# Patient Record
Sex: FEMALE | Race: WHITE | NOT HISPANIC OR LATINO
[De-identification: names, ages, dates, MRNs, and addresses within clinical notes are randomized per-mention and may not be internally consistent; named-entity substitution may affect disease eponyms.]

---

## 2022-07-05 PROBLEM — Z00.00 ENCOUNTER FOR PREVENTIVE HEALTH EXAMINATION: Status: ACTIVE | Noted: 2022-07-05

## 2022-07-06 ENCOUNTER — APPOINTMENT (OUTPATIENT)
Dept: PAIN MANAGEMENT | Facility: CLINIC | Age: 57
End: 2022-07-06

## 2022-07-06 VITALS
BODY MASS INDEX: 25.9 KG/M2 | DIASTOLIC BLOOD PRESSURE: 74 MMHG | SYSTOLIC BLOOD PRESSURE: 149 MMHG | WEIGHT: 165 LBS | HEIGHT: 67 IN | HEART RATE: 78 BPM

## 2022-07-06 PROCEDURE — 99213 OFFICE O/P EST LOW 20 MIN: CPT

## 2022-07-06 NOTE — PHYSICAL EXAM
[Normal Coordination] : normal coordination [Normal DTR UE/LE] : normal DTR UE/LE  [Normal Sensation] : normal sensation [Normal Mood and Affect] : normal mood and affect [Orientated] : orientated [Normal Skin] : normal skin [No Rash] : no rash [No Ulcers] : no ulcers [No Lesions] : no lesions [No obvious lymphadenopathy in areas examined] : no obvious lymphadenopathy in areas examined [] : diminished ROM in all planes

## 2022-07-06 NOTE — HISTORY OF PRESENT ILLNESS
[FreeTextEntry1] : ORIGINAL PRESENTATION: She is a 56-year-old female who continues to follow up with our office regarding cervical radicular complaints status post laminectomy as well as lumbar pain complaints with radicular symptoms status post laminectomy as well. The patient has failed numerous trials of injection therapies in the past and does continue with medication management.\par \par TODAY: I had the pleasure of seeing Ms. Gagnon today in follow up. Her previous history and physical findings have been reviewed.\par \par She is under our care for chronic cervical and lumbar pain with radiculopathy which she is receiving continuing active treatment for.  She states nothing has changed over the past few weeks with respect to her condition.  She continues to remain stable on a regimen of Oxycontin 60 mg BID in conjunction with Oxycodone 30 mg QID for breakthrough pain stating it provides her with at least 50% relief without adverse effects. We will therefore continue as is without change and previous UDS testing is consistent.

## 2022-07-06 NOTE — ASSESSMENT
[FreeTextEntry1] : 57 year old female with chronic lumbar and cervical pain. We will continue to prescribe Oxycontin 60mg bid as well as oxycodone IR 30 mg QID. She will f/u in 4 weeks time for re evaluation. If there is any issue she is aware she can contact the office.\par \par I personally reviewed with the PA, this patient's history and physical exam findings, as documented above. I have discussed the relevant areas of concern, having direct implications to the presenting problems and illnesses, and I have personally examined all pertinent and positive and negative findings, which impact on the prior pain management treatment. \par \par \par Check of the  registry reveals compliance in regards to narcotic medication management use\par \par \par Naomi Fernandez, MS, PA-C\par Abida Haynes MD\par

## 2022-07-12 ENCOUNTER — RX RENEWAL (OUTPATIENT)
Age: 57
End: 2022-07-12

## 2022-10-12 ENCOUNTER — APPOINTMENT (OUTPATIENT)
Dept: PAIN MANAGEMENT | Facility: CLINIC | Age: 57
End: 2022-10-12

## 2022-10-12 PROCEDURE — 99214 OFFICE O/P EST MOD 30 MIN: CPT

## 2022-10-12 NOTE — HISTORY OF PRESENT ILLNESS
[FreeTextEntry1] : ORIGINAL PRESENTATION: She is a 56-year-old female who continues to follow up with our office regarding cervical radicular complaints status post laminectomy as well as lumbar pain complaints with radicular symptoms status post laminectomy as well. The patient has failed numerous trials of injection therapies in the past and does continue with medication management.\par \par TODAY: I had the pleasure of seeing Ms. Gagnon today in follow up. Her previous history and physical findings have been reviewed.\par \par She is under our care for chronic cervical and lumbar pain with radiculopathy which she is receiving continuing active treatment for.  She states over the past few months she has been experiencing more pain her lower back radiating down her L leg with associated numbness.  She currently rates it a 6/10 and at worst at 10/10.  She has not had updated imaging in some time and denies any injury.  In the interim she remains stable on a regimen of Oxycontin 60 mg BID in conjunction with Oxycodone 30 mg QID for breakthrough pain stating it provides her with at least 50% relief without adverse effects. We will therefore continue as is without change and previous UDS testing is consistent.

## 2022-10-12 NOTE — ASSESSMENT
[FreeTextEntry1] : 57 year old female with chronic lumbar and cervical pain. We will continue to prescribe Oxycontin 60mg bid as well as oxycodone IR 30 mg QID.  We will have her undergo updated MRI lumbar spine for further assessment.  She will f/u in 4 weeks time for re evaluation. If there is any issue she is aware she can contact the office.\par \par I personally reviewed with the PA, this patient's history and physical exam findings, as documented above. I have discussed the relevant areas of concern, having direct implications to the presenting problems and illnesses, and I have personally examined all pertinent and positive and negative findings, which impact on the prior pain management treatment. \par \par \par Check of the  registry reveals compliance in regards to narcotic medication management use\par \par Naomi Fernandez, MS, PA-C\par Abhilash Corado, \par

## 2022-10-21 ENCOUNTER — NON-APPOINTMENT (OUTPATIENT)
Age: 57
End: 2022-10-21

## 2022-11-10 ENCOUNTER — APPOINTMENT (OUTPATIENT)
Dept: PAIN MANAGEMENT | Facility: CLINIC | Age: 57
End: 2022-11-10

## 2022-11-21 ENCOUNTER — APPOINTMENT (OUTPATIENT)
Dept: PAIN MANAGEMENT | Facility: CLINIC | Age: 57
End: 2022-11-21

## 2022-11-21 VITALS
HEIGHT: 67 IN | WEIGHT: 165 LBS | BODY MASS INDEX: 25.9 KG/M2 | HEART RATE: 76 BPM | DIASTOLIC BLOOD PRESSURE: 76 MMHG | SYSTOLIC BLOOD PRESSURE: 139 MMHG

## 2022-11-21 LAB — COCAINE METAB.OTHER UR-MCNC: NORMAL

## 2022-11-21 PROCEDURE — 99406 BEHAV CHNG SMOKING 3-10 MIN: CPT

## 2022-11-21 PROCEDURE — 99214 OFFICE O/P EST MOD 30 MIN: CPT

## 2022-11-21 NOTE — ASSESSMENT
[FreeTextEntry1] : Ms. Samantha Gagnon is a 57 year old female with chronic lumbar and cervical pain. We will continue to prescribe Oxycontin 60mg bid as well as oxycodone IR 30 mg QID.   Overall there is at least a 30-50% reduction in pain with the prescribed analgesics. The patient denies any adverse side effects due to the medication (sleeping disturbance, constipation, sleepiness, hallucinations and/or urination problems). She will f/u in 4 weeks time for re evaluation. If there is any issue she is aware she can contact the office.\par \par Check of the  registry reveals compliance in regards to narcotic medication management use\par \par UDS completed on 4/22/22 - consistent. To be repeated today. \par \par I, Natasha Abbott, attest that this documentation has been prepared under the direction and in the presence of Provider Abhilash Corado DO\par The documentation recorded by the scribe, in my presence, accurately reflects the service I personally performed, and the decisions made by me with my edits as appropriate.\par \par Best Regards, \par Abhilash Corado, D.O. \par Diplomat, [employer] Anesthesiology \par Diplomat, American Board of Pain Medicine \par Diplomat, American Board of Pain Management

## 2022-11-21 NOTE — HISTORY OF PRESENT ILLNESS
[FreeTextEntry1] : ORIGINAL PRESENTATION: She is a 56-year-old female who continues to follow up with our office regarding cervical radicular complaints status post laminectomy as well as lumbar pain complaints with radicular symptoms status post laminectomy as well. The patient has failed numerous trials of injection therapies in the past and does continue with medication management.\par \par TODAY: In revisit encounter in regard to her chronic cervical and lumbar pain with radiculopathy which she is receiving continuing active treatment for. She states over the past few months she has been experiencing more pain her lower back radiating down her L leg with associated numbness. She has not had updated imaging in some time and denies any injury.  In the interim she remains stable on a regimen of Oxycontin 60 mg BID in conjunction with Oxycodone 30 mg QID for breakthrough pain stating it provides her with at least 50% relief without adverse effects. We will therefore continue as is without change. \par \par UDS completed on 4/6/22 - consistent. \par \par Covid 19 - This in-office encounter has occurred during a Public Health Emergency (PHE). As required by law, due to the risk of respiratory-transmitted infectious diseases, our office provided additional materials, supplies and clinical staff to assist in keeping our patients, physicians and office staff safe during this health emergency.

## 2022-11-22 ENCOUNTER — LABORATORY RESULT (OUTPATIENT)
Age: 57
End: 2022-11-22

## 2023-01-03 ENCOUNTER — APPOINTMENT (OUTPATIENT)
Dept: PAIN MANAGEMENT | Facility: CLINIC | Age: 58
End: 2023-01-03
Payer: MEDICARE

## 2023-01-03 VITALS
WEIGHT: 165 LBS | SYSTOLIC BLOOD PRESSURE: 116 MMHG | HEART RATE: 116 BPM | DIASTOLIC BLOOD PRESSURE: 73 MMHG | BODY MASS INDEX: 25.9 KG/M2 | HEIGHT: 67 IN

## 2023-01-03 PROCEDURE — 99214 OFFICE O/P EST MOD 30 MIN: CPT

## 2023-01-03 NOTE — ASSESSMENT
[FreeTextEntry1] : Ms. Samantha Gagnon is a 57 year old female with chronic lumbar and cervical pain. She has been managing her pain medically with Oxycontin 60mg BID as well as oxycodone IR 30 mg QID. At this time we will decrease her oxycodone IR 30mg to TID. Overall there is at least a 30-50% reduction in pain with the prescribed analgesics. The patient denies any adverse side effects due to the medication (sleeping disturbance, constipation, sleepiness, hallucinations and/or urination problems). She will f/u in 4 weeks time for re evaluation. If there is any issue she is aware she can contact the office.\par \par Check of the  registry reveals compliance in regards to narcotic medication management use\par \par UDS completed on 11/22/22 - consistent and up to date. \par \par I, Natasha Abbott, attest that this documentation has been prepared under the direction and in the presence of Provider Abhilash Corado DO\par The documentation recorded by the scribe, in my presence, accurately reflects the service I personally performed, and the decisions made by me with my edits as appropriate.\par \par Best Regards, \par Abhilash Corado D.O. \par Diplomat, American Board of Anesthesiology \par Diplomat, American Board of Pain Medicine \par Diplomat, American Board of Pain Management

## 2023-01-03 NOTE — HISTORY OF PRESENT ILLNESS
[FreeTextEntry1] : ORIGINAL PRESENTATION: She is a 56-year-old female who continues to follow up with our office regarding cervical radicular complaints status post laminectomy as well as lumbar pain complaints with radicular symptoms status post laminectomy as well. The patient has failed numerous trials of injection therapies in the past and does continue with medication management.\par \par TODAY: In revisit encounter in regard to her chronic cervical and lumbar pain with radiculopathy which she is receiving continuing active treatment for. She states over the past few months she has been experiencing more pain her lower back radiating down her L leg with associated numbness. She has not had updated imaging in some time and denies any injury.  In the interim she remains stable on a regimen of Oxycontin 60 mg BID in conjunction with Oxycodone 30 mg QID for breakthrough pain stating it provides her with at least 50% relief without adverse effects. \par \par UDS completed on 4/6/22 - consistent. \par \par Covid 19 - This in-office encounter has occurred during a Public Health Emergency (PHE). As required by law, due to the risk of respiratory-transmitted infectious diseases, our office provided additional materials, supplies and clinical staff to assist in keeping our patients, physicians and office staff safe during this health emergency.

## 2023-01-31 ENCOUNTER — APPOINTMENT (OUTPATIENT)
Dept: PAIN MANAGEMENT | Facility: CLINIC | Age: 58
End: 2023-01-31
Payer: MEDICARE

## 2023-01-31 VITALS — WEIGHT: 165 LBS | BODY MASS INDEX: 25.9 KG/M2 | HEIGHT: 67 IN

## 2023-01-31 PROCEDURE — 99213 OFFICE O/P EST LOW 20 MIN: CPT

## 2023-01-31 NOTE — HISTORY OF PRESENT ILLNESS
[FreeTextEntry1] : ORIGINAL PRESENTATION: She is a 57-year-old female who continues to follow up with our office regarding cervical radicular complaints status post laminectomy as well as lumbar pain complaints with radicular symptoms status post laminectomy as well. The patient has failed numerous trials of injection therapies in the past and does continue with medication management.\par \par TODAY: She presents for a revisit appointment. She suffers with chronic cervical and lumbar pain with radiculopathy which she is receiving continuing active treatment for. She has been experiencing more pain in her lower back radiating down her L leg with associated numbness. She plans to undergo a MRI of the lumbar spine in the near future.\par \par She is medically managed on a regimen of Oxycontin 60 mg BID in conjunction with Oxycodone 30 mg TID for breakthrough. Her Oxycodone was decreased from QID to TID on her last visit. Patient wishes to hold off on decreasing further.\par \par UDS: 11/2022 - Consistent.\par SOWANG, updated today, 1/31/23 - LOW RISK.

## 2023-01-31 NOTE — DISCUSSION/SUMMARY
[de-identified] : I have consulted the  registry for the purpose of reviewing the patient's controlled substance.\par \par Overall there is at least a 30-50% reduction in pain with the prescribed analgesics. The patient denies any adverse side effects due to the medication (sleeping disturbance, constipation, sleepiness, hallucinations and/or urination problems). \par \par There are no inconsistencies on urine toxicology screening which would necessitate an alteration of therapy.\par The patient will return to the office in 4 weeks time and is aware to contact me with any question or concerns in the interim.\par \par Kaur Gutierrez PA-C\par Abhilash Corado DO\par

## 2023-01-31 NOTE — ASSESSMENT
[FreeTextEntry1] : Ms. Samantha Gagnon is a 57 year old female with chronic lumbar and cervical pain along with radiculopathy. She has been managing her pain medically. She is currently on Oxycontin 60mg BID as well as oxycodone IR 30 mg TID. She will continue as is for now. RTO in 4 weeks.

## 2023-02-22 ENCOUNTER — NON-APPOINTMENT (OUTPATIENT)
Age: 58
End: 2023-02-22

## 2023-02-28 ENCOUNTER — APPOINTMENT (OUTPATIENT)
Dept: PAIN MANAGEMENT | Facility: CLINIC | Age: 58
End: 2023-02-28
Payer: MEDICARE

## 2023-02-28 ENCOUNTER — LABORATORY RESULT (OUTPATIENT)
Age: 58
End: 2023-02-28

## 2023-02-28 VITALS
BODY MASS INDEX: 25.9 KG/M2 | DIASTOLIC BLOOD PRESSURE: 89 MMHG | HEART RATE: 97 BPM | SYSTOLIC BLOOD PRESSURE: 148 MMHG | WEIGHT: 165 LBS | HEIGHT: 67 IN

## 2023-02-28 PROCEDURE — 99213 OFFICE O/P EST LOW 20 MIN: CPT

## 2023-02-28 PROCEDURE — 80305 DRUG TEST PRSMV DIR OPT OBS: CPT | Mod: QW

## 2023-02-28 NOTE — HISTORY OF PRESENT ILLNESS
[FreeTextEntry1] : ORIGINAL PRESENTATION: She is a 57-year-old female who continues to follow up with our office regarding cervical radicular complaints status post laminectomy as well as lumbar pain complaints with radicular symptoms status post laminectomy as well. The patient has failed numerous trials of injection therapies in the past and does continue with medication management.\par \par TODAY: She presents for a revisit appointment. She suffers with chronic cervical and lumbar pain with radiculopathy which she is receiving continuing active treatment for. She has been experiencing more pain in her lower back radiating down her L leg with associated numbness. She is in the process of moving to [place] community. Therefore, she has not followed up with a MRI of the lumbar spine yet. She plans to do so in the next few weeks.\par \par She is medically managed on a regimen of Oxycontin 60 mg BID in conjunction with Oxycodone 30 mg TID for breakthrough. She denies side effects.\par \par UDS: repeated today, 2/28/23 - see separate note.\par SOAPP, 1/31/23 - LOW RISK.

## 2023-02-28 NOTE — DISCUSSION/SUMMARY
[de-identified] : I have consulted the  registry for the purpose of reviewing the patient's controlled substance.\par \par Overall there is at least a 30-50% reduction in pain with the prescribed analgesics. The patient denies any adverse side effects due to the medication (sleeping disturbance, constipation, sleepiness, hallucinations and/or urination problems). \par Urine drug screening collected today with rapid sample result consistent with given regimen. Sample to be sent for confirmatory testing.\par The patient will return to the office in 4 weeks time and is aware to contact me with any question or concerns in the interim.\par \par Kaur Gutierrez PA-C\par Abhilash Corado DO\par

## 2023-02-28 NOTE — ASSESSMENT
[FreeTextEntry1] : Ms. Samantha Gagnon is a 57 year old female with chronic lumbar and cervical pain along with radiculopathy. She has been managing her pain medically. She is currently on Oxycontin 60mg BID as well as oxycodone IR 30 mg TID. I have discussed the importance of undergoing a new MRI of the lumbar spine. If she fails to do so, I will have to continue to wean her off of her opioids. She was weaned from Oxycodone IR 30 mg QID to TID a few weeks ago. She will follow up in 4 weeks for reevaluation.

## 2023-03-01 ENCOUNTER — RESULT CHARGE (OUTPATIENT)
Age: 58
End: 2023-03-01

## 2023-03-01 LAB
AMP / AMPHETAMINE: NEGATIVE
BAR / SECOBARBITAL: NEGATIVE
BUP / BUPRENORPHINE: NEGATIVE
BZO / OXAZEPAM: NEGATIVE
COC / COCAINE: NEGATIVE
CREATININE: 100 MG/DL
MDMA / METHYLENEDIOXYMETHAMPHETAMINE: NEGATIVE
MET / METHAMPHETAMINES: NEGATIVE
MOP / MORPHINE: NEGATIVE
MTD / METHADONE: NEGATIVE
OXY / OXYCODONE: POSITIVE
PCP / PHENCYCLIDINE: NEGATIVE
PH: 7
SPECIFIC GRAVITY: 1.03
TEMPERATURE: 94 C
THC / MARIJUANA: NEGATIVE

## 2023-03-28 ENCOUNTER — APPOINTMENT (OUTPATIENT)
Dept: PAIN MANAGEMENT | Facility: CLINIC | Age: 58
End: 2023-03-28
Payer: MEDICARE

## 2023-03-28 VITALS
BODY MASS INDEX: 25.9 KG/M2 | HEIGHT: 67 IN | HEART RATE: 89 BPM | WEIGHT: 165 LBS | DIASTOLIC BLOOD PRESSURE: 72 MMHG | SYSTOLIC BLOOD PRESSURE: 132 MMHG

## 2023-03-28 PROCEDURE — 99213 OFFICE O/P EST LOW 20 MIN: CPT

## 2023-03-28 NOTE — ASSESSMENT
[FreeTextEntry1] : Ms. Samantha Gagnon is a 57 year old female with chronic lumbar and cervical pain along with radiculopathy. She has been managing her pain medically. She is currently on Oxycontin 60mg BID as well as oxycodone IR 30 mg TID. I have discussed the importance of undergoing a new MRI of the lumbar spine. If she fails to do so, I will have to continue to wean her off of her opioids. She plans to get the MRI done in the next few weeks. She will follow up in 4 weeks for reevaluation. A repeat UDS will be done at that time.

## 2023-03-28 NOTE — DISCUSSION/SUMMARY
[de-identified] : I have consulted the  registry for the purpose of reviewing the patient's controlled substance.\par \par Overall there is at least a 30-50% reduction in pain with the prescribed analgesics. The patient denies any adverse side effects due to the medication (sleeping disturbance, constipation, sleepiness, hallucinations and/or urination problems). \par The patient will return to the office in 4 weeks time and is aware to contact me with any question or concerns in the interim.\par \par aKur Gutierrez PA-C\par Abhilash Corado DO\par

## 2023-03-28 NOTE — HISTORY OF PRESENT ILLNESS
[FreeTextEntry1] : ORIGINAL PRESENTATION: She is a 57-year-old female who continues to follow up with our office regarding cervical radicular complaints status post laminectomy as well as lumbar pain complaints with radicular symptoms status post laminectomy as well. The patient has failed numerous trials of injection therapies in the past and does continue with medication management.\par \par TODAY: She presents for a revisit appointment. She suffers with chronic cervical and lumbar pain with radiculopathy which she is receiving continuing active treatment for. She has been experiencing more pain in her lower back radiating down her L leg with associated numbness. She is in the process of moving to a [place] community. Therefore, she has not followed up with a MRI of the lumbar spine yet. She states she will hopefully get it done prior to her next visit.\par \par She is medically managed on a regimen of Oxycontin 60 mg BID in conjunction with Oxycodone 30 mg TID for breakthrough. She denies side effects. No aberrant behavior.\par \par UDS: 2/28/23 - not done due to high pH. \par SOAPP, 1/31/23 - LOW RISK.

## 2023-04-26 ENCOUNTER — LABORATORY RESULT (OUTPATIENT)
Age: 58
End: 2023-04-26

## 2023-04-27 ENCOUNTER — APPOINTMENT (OUTPATIENT)
Dept: PAIN MANAGEMENT | Facility: CLINIC | Age: 58
End: 2023-04-27
Payer: MEDICARE

## 2023-04-27 VITALS — BODY MASS INDEX: 25.9 KG/M2 | HEIGHT: 67 IN | WEIGHT: 165 LBS

## 2023-04-27 LAB
AMP / AMPHETAMINE: NEGATIVE
BAR / SECOBARBITAL: NEGATIVE
BUP / BUPRENORPHINE: NEGATIVE
BZO / OXAZEPAM: NEGATIVE
COC / COCAINE: NEGATIVE
CREATININE: 100 MG/DL
MDMA / METHYLENEDIOXYMETHAMPHETAMINE: NEGATIVE
MET / METHAMPHETAMINES: NEGATIVE
MOP / MORPHINE: POSITIVE
MTD / METHADONE: NEGATIVE
OXY / OXYCODONE: POSITIVE
PCP / PHENCYCLIDINE: NEGATIVE
PH: 5
SPECIFIC GRAVITY: 1.02
TEMPERATURE: 90 C
THC / MARIJUANA: NEGATIVE

## 2023-04-27 PROCEDURE — 99213 OFFICE O/P EST LOW 20 MIN: CPT

## 2023-04-27 PROCEDURE — 80305 DRUG TEST PRSMV DIR OPT OBS: CPT | Mod: QW

## 2023-04-27 NOTE — ASSESSMENT
[FreeTextEntry1] : Ms. Samantha Gagnon is a 57 year old female with chronic lumbar and cervical pain along with radiculopathy. She has been managing her pain medically. She is currently on Oxycontin 60mg BID as well as oxycodone IR 30 mg TID. I have discussed the importance of undergoing a new MRI of the lumbar spine. If she fails to do so, I will have to continue to wean her off of her opioids. She plans to get the MRI done in the next few weeks. She will follow up in 4 weeks for reevaluation.

## 2023-04-27 NOTE — DISCUSSION/SUMMARY
[de-identified] : I have consulted the  registry for the purpose of reviewing the patient's controlled substance.\par \par Overall there is at least a 30-50% reduction in pain with the prescribed analgesics. The patient denies any adverse side effects due to the medication (sleeping disturbance, constipation, sleepiness, hallucinations and/or urination problems). \par Urine drug screening collected today with rapid sample result consistent with given regimen. Sample to be sent for confirmatory testing.\par The patient will return to the office in 4 weeks time and is aware to contact me with any question or concerns in the interim.\par \par Kaur Gutierrez PA-C\par Abhilash Corado DO\par

## 2023-04-27 NOTE — HISTORY OF PRESENT ILLNESS
[FreeTextEntry1] : ORIGINAL PRESENTATION: She is a 57-year-old female who continues to follow up with our office regarding cervical radicular complaints status post laminectomy as well as lumbar pain complaints with radicular symptoms status post laminectomy as well. The patient has failed numerous trials of injection therapies in the past and does continue with medication management.\par \par TODAY: She presents for a revisit appointment. She suffers with chronic cervical and lumbar pain with radiculopathy which she is receiving continuing active treatment for. She has been experiencing more pain in her lower back radiating down her L leg with associated numbness. She will be moving to a [place] community tomorrow. Therefore, she has not followed up with a MRI of the lumbar spine yet. \par \par She is medically managed on a regimen of Oxycontin 60 mg BID in conjunction with Oxycodone 30 mg TID for breakthrough. She denies side effects. No aberrant behavior.\par \par UDS: repeated today, 4/27/23 - see separate note.\par SOAPP, 1/31/23 - LOW RISK.

## 2023-05-03 LAB

## 2023-05-25 ENCOUNTER — APPOINTMENT (OUTPATIENT)
Dept: PAIN MANAGEMENT | Facility: CLINIC | Age: 58
End: 2023-05-25
Payer: MEDICARE

## 2023-05-25 VITALS — WEIGHT: 165 LBS | HEIGHT: 67 IN | BODY MASS INDEX: 25.9 KG/M2

## 2023-05-25 PROCEDURE — 99213 OFFICE O/P EST LOW 20 MIN: CPT

## 2023-05-25 NOTE — HISTORY OF PRESENT ILLNESS
[FreeTextEntry1] : ORIGINAL PRESENTATION: She is a 57-year-old female who continues to follow up with our office regarding cervical radicular complaints status post laminectomy as well as lumbar pain complaints with radicular symptoms status post laminectomy as well. The patient has failed numerous trials of injection therapies in the past and does continue with medication management.\par \par TODAY: She presents for a revisit appointment. She suffers with chronic cervical and lumbar pain with radiculopathy which she is receiving continuing active treatment for. She has been experiencing more pain in her lower back radiating down her L leg with associated numbness. She moved into a [place] community. She is yet to follow up a MRI of the lumbar spine.\par \par She is medically managed on a regimen of Oxycontin 60 mg BID in conjunction with Oxycodone 30 mg TID for breakthrough. She denies side effects. No aberrant behavior.\par \par UDS: 4/27/23 - Consistent.\par SOAPP, 1/31/23 - LOW RISK.

## 2023-05-25 NOTE — DISCUSSION/SUMMARY
[de-identified] : I have consulted the  registry for the purpose of reviewing the patient's controlled substance.\par \par Overall there is at least a 30-50% reduction in pain with the prescribed analgesics. The patient denies any adverse side effects due to the medication (sleeping disturbance, constipation, sleepiness, hallucinations and/or urination problems). \par There are no inconsistencies on urine toxicology screening which would necessitate an alteration of therapy.\par The patient will return to the office in 4 weeks time and is aware to contact me with any question or concerns in the interim.\par \par Kaur Gutierrez PA-C\par Abhilash Corado DO\par  routine PPM follow up.

## 2023-05-25 NOTE — ASSESSMENT
[FreeTextEntry1] : Ms. Samantha Gagnon is a 57 year old female with chronic lumbar and cervical pain along with radiculopathy. She has been managing her pain medically. She is currently on Oxycontin 60mg BID as well as oxycodone IR 30 mg TID. She will follow up in 4 weeks for reevaluation.

## 2023-06-22 ENCOUNTER — APPOINTMENT (OUTPATIENT)
Dept: PAIN MANAGEMENT | Facility: CLINIC | Age: 58
End: 2023-06-22

## 2023-07-25 ENCOUNTER — APPOINTMENT (OUTPATIENT)
Dept: PAIN MANAGEMENT | Facility: CLINIC | Age: 58
End: 2023-07-25
Payer: MEDICARE

## 2023-07-25 VITALS
DIASTOLIC BLOOD PRESSURE: 85 MMHG | HEIGHT: 67 IN | WEIGHT: 165 LBS | SYSTOLIC BLOOD PRESSURE: 149 MMHG | HEART RATE: 74 BPM | BODY MASS INDEX: 25.9 KG/M2

## 2023-07-25 PROCEDURE — 99213 OFFICE O/P EST LOW 20 MIN: CPT

## 2023-07-25 NOTE — HISTORY OF PRESENT ILLNESS
[FreeTextEntry1] : ORIGINAL PRESENTATION: She is a 58-year-old female who continues to follow up with our office regarding cervical radicular complaints status post laminectomy as well as lumbar pain complaints with radicular symptoms status post laminectomy as well. The patient has failed numerous trials of injection therapies in the past and does continue with medication management.\par \par TODAY: She presents for a revisit appointment. She suffers with chronic cervical and lumbar pain with radiculopathy which she is receiving continuing active treatment for. She continues to have increased pain in her lower back radiating down her L leg with associated numbness. She is yet to follow up a MRI of the lumbar spine. She plans to follow up with it by the end of next month.\par \par She is medically managed on a regimen of Oxycontin 60 mg BID in conjunction with Oxycodone 30 mg TID for breakthrough. She denies side effects. No aberrant behavior.\par \par UDS: 4/27/23 - Consistent.\par SOAPP, 1/31/23 - LOW RISK.

## 2023-07-25 NOTE — DISCUSSION/SUMMARY
[de-identified] : I have consulted the  registry for the purpose of reviewing the patient's controlled substance.\par \par Overall there is at least a 30-50% reduction in pain with the prescribed analgesics. The patient denies any adverse side effects due to the medication (sleeping disturbance, constipation, sleepiness, hallucinations and/or urination problems). \par There are no inconsistencies on urine toxicology screening which would necessitate an alteration of therapy.\par The patient will return to the office in 4 weeks time and is aware to contact me with any question or concerns in the interim.\par \par Kaur Gutierrez PA-C\par Abhilash Corado DO\par

## 2023-07-25 NOTE — ASSESSMENT
[FreeTextEntry1] : Ms. Samantha Gagnon is a 58 year old female with chronic lumbar and cervical pain along with radiculopathy. She has been managing her pain medically. She is currently on Oxycontin 60mg BID as well as oxycodone IR 30 mg TID. She will follow up in 4 weeks for reevaluation.

## 2023-07-25 NOTE — DISCUSSION/SUMMARY
[de-identified] : I have consulted the  registry for the purpose of reviewing the patient's controlled substance.\par \par Overall there is at least a 30-50% reduction in pain with the prescribed analgesics. The patient denies any adverse side effects due to the medication (sleeping disturbance, constipation, sleepiness, hallucinations and/or urination problems). \par There are no inconsistencies on urine toxicology screening which would necessitate an alteration of therapy.\par The patient will return to the office in 4 weeks time and is aware to contact me with any question or concerns in the interim.\par \par Kaur Gutierrez PA-C\par Abhilash Corado DO\par

## 2023-08-24 ENCOUNTER — LABORATORY RESULT (OUTPATIENT)
Age: 58
End: 2023-08-24

## 2023-08-24 ENCOUNTER — APPOINTMENT (OUTPATIENT)
Dept: PAIN MANAGEMENT | Facility: CLINIC | Age: 58
End: 2023-08-24
Payer: MEDICARE

## 2023-08-24 PROCEDURE — 99213 OFFICE O/P EST LOW 20 MIN: CPT

## 2023-08-24 PROCEDURE — 80305 DRUG TEST PRSMV DIR OPT OBS: CPT | Mod: QW

## 2023-08-24 NOTE — HISTORY OF PRESENT ILLNESS
[FreeTextEntry1] : ORIGINAL PRESENTATION: She is a 58-year-old female who continues to follow up with our office regarding cervical radicular complaints status post laminectomy as well as lumbar pain complaints with radicular symptoms status post laminectomy as well. The patient has failed numerous trials of injection therapies in the past and does continue with medication management.  TODAY: She presents for a revisit appointment. She suffers with chronic cervical and lumbar pain with radiculopathy which she is receiving continuing active treatment for. She continues to have increased pain in her lower back radiating down her L leg with associated numbness. She underwent a MRI of lumbar spine at [facility]. I do not have the results for my review today.  She is medically managed on a regimen of Oxycontin 60 mg BID in conjunction with Oxycodone 30 mg TID for breakthrough. She denies side effects. No aberrant behavior.  UDS: repeated today, 8/24/23 - see separate note. XU, 1/31/23 - LOW RISK.

## 2023-08-24 NOTE — PHYSICAL EXAM
[Normal Coordination] : normal coordination [Normal Sensation] : normal sensation [Normal DTR UE/LE] : normal DTR UE/LE  [Normal Mood and Affect] : normal mood and affect [Orientated] : orientated [Normal Skin] : normal skin [No Rash] : no rash [No Ulcers] : no ulcers [No Lesions] : no lesions [No obvious lymphadenopathy in areas examined] : no obvious lymphadenopathy in areas examined [] : diminished ROM in all planes

## 2023-08-24 NOTE — ASSESSMENT
[FreeTextEntry1] : Ms. Samantha Gagnon is a 58 year old female with chronic lumbar and cervical pain along with radiculopathy. She underwent a MRI of the lumbar spine. We will attempt to get the report faxed to our office. I will call the patient once I receive them. Pending the findings, injections will be discussed. She will continue with Oxycontin 60mg BID as well as oxycodone IR 30 mg TID. She will follow up in 4 weeks for reevaluation.

## 2023-08-24 NOTE — DISCUSSION/SUMMARY
[de-identified] : I have consulted the  registry for the purpose of reviewing the patient's controlled substance.  Overall there is at least a 30-50% reduction in pain with the prescribed analgesics. The patient denies any adverse side effects due to the medication (sleeping disturbance, constipation, sleepiness, hallucinations and/or urination problems).  Urine drug screening collected today. Sample to be sent for confirmatory testing. The patient will return to the office in 4 weeks time and is aware to contact me with any question or concerns in the interim.  GERMÁN Arrieta, DO

## 2023-09-01 LAB
PM 6 MAM: NEGATIVE NG/ML
PM 7-AMINO-CLONAZ: NEGATIVE NG/ML
PM ALPHA-HYDROXY-ALPRAZOLAM: NEGATIVE NG/ML
PM ALPHA-HYDROXY-MIDAZOLAM: NEGATIVE NG/ML
PM ALPRAZOLAM: NEGATIVE NG/ML
PM AMOBARBITAL: NEGATIVE NG/ML
PM AMPHETAMINE INTERP: NEGATIVE
PM AMPHETAMINE: NEGATIVE NG/ML
PM BARBURATES INTERP: NEGATIVE
PM BEG: NEGATIVE NG/ML
PM BENZODIAZEPINES INTERP: NEGATIVE
PM BUPRENORPHINE INTERP: NEGATIVE
PM BUPRENORPHINE: NEGATIVE NG/ML
PM BUTALBITAL: NEGATIVE NG/ML
PM CLONAZEPAM: NEGATIVE NG/ML
PM COCAINE INTERP: NEGATIVE
PM COCAINE: NEGATIVE NG/ML
PM CODIENE: NEGATIVE NG/ML
PM COTININE: >1000 NG/ML
PM DIAZEPAM: NEGATIVE NG/ML
PM DIHYROCODEINE: NEGATIVE NG/ML
PM EDDP: NEGATIVE NG/ML
PM FENTANYL INTERP: NEGATIVE
PM FENTANYL: NEGATIVE NG/ML
PM FLUNITRAZEPAM: NEGATIVE NG/ML
PM FLURAZEPAM: NEGATIVE NG/ML
PM HYDROCODONE: NEGATIVE NG/ML
PM HYDROMORPHONE: NEGATIVE NG/ML
PM LORAZEPAM: NEGATIVE NG/ML
PM MARIJUANA (DELTA-9-THC): NEGATIVE NG/ML
PM MARIJUANA INTERP: NEGATIVE
PM MDA: NEGATIVE NG/ML
PM MDEA: NEGATIVE NG/ML
PM MDMA: NEGATIVE NG/ML
PM MEPERIDINE: NEGATIVE NG/ML
PM METHADONE INTERP: NEGATIVE
PM METHADONE: NEGATIVE NG/ML
PM METHAMPHETAMINE: NEGATIVE NG/ML
PM MIDAZOLAM: NEGATIVE NG/ML
PM MORPHINE: NEGATIVE NG/ML
PM NALOXONE: NEGATIVE NG/ML
PM NALTREXONE: NEGATIVE NG/ML
PM NICOTINE INTERP: POSITIVE
PM NORBUPRENORPHINE: NEGATIVE NG/ML
PM NORDIAZEPAM: NEGATIVE NG/ML
PM NORMEPERIDINE: NEGATIVE NG/ML
PM NOROXYCODONE: >1000 NG/ML
PM OPIOID INTERP: NEGATIVE
PM OXAZEPAM: NEGATIVE NG/ML
PM OXXYCODONE INTERP: POSITIVE
PM OXYCODONE: >1000 NG/ML
PM OXYMORPHONE: 415 NG/ML
PM PCP: NEGATIVE NG/ML
PM PHENCYCLIDINE INTERP: NEGATIVE
PM PHENOBARBITAL: NEGATIVE NG/ML
PM PPX: NEGATIVE NG/ML
PM PROPOXYPHENE INTERP: NEGATIVE
PM SECOBARBITAL: NEGATIVE NG/ML
PM SUFENTANIL: NEGATIVE NG/ML
PM TAPENTADOL: NEGATIVE NG/ML
PM TEMAZEPAM: NEGATIVE NG/ML
PM TRAMADOL INTERP: NEGATIVE
PM TRAMADOL: NEGATIVE NG/ML

## 2023-09-21 ENCOUNTER — APPOINTMENT (OUTPATIENT)
Dept: PAIN MANAGEMENT | Facility: CLINIC | Age: 58
End: 2023-09-21
Payer: MEDICARE

## 2023-09-21 PROCEDURE — 99214 OFFICE O/P EST MOD 30 MIN: CPT

## 2023-09-21 RX ORDER — OXYCODONE HYDROCHLORIDE 30 MG/1
30 TABLET ORAL 3 TIMES DAILY
Qty: 90 | Refills: 0 | Status: COMPLETED | COMMUNITY
Start: 2022-07-06 | End: 2023-09-21

## 2023-10-19 ENCOUNTER — APPOINTMENT (OUTPATIENT)
Dept: PAIN MANAGEMENT | Facility: CLINIC | Age: 58
End: 2023-10-19

## 2023-10-25 ENCOUNTER — APPOINTMENT (OUTPATIENT)
Dept: PAIN MANAGEMENT | Facility: CLINIC | Age: 58
End: 2023-10-25
Payer: MEDICARE

## 2023-10-25 VITALS
WEIGHT: 174 LBS | BODY MASS INDEX: 27.31 KG/M2 | HEIGHT: 67 IN | SYSTOLIC BLOOD PRESSURE: 182 MMHG | DIASTOLIC BLOOD PRESSURE: 93 MMHG | HEART RATE: 74 BPM

## 2023-10-25 PROCEDURE — 99213 OFFICE O/P EST LOW 20 MIN: CPT

## 2023-11-24 ENCOUNTER — APPOINTMENT (OUTPATIENT)
Dept: PAIN MANAGEMENT | Facility: CLINIC | Age: 58
End: 2023-11-24
Payer: MEDICARE

## 2023-11-24 VITALS — HEIGHT: 67 IN | BODY MASS INDEX: 27.31 KG/M2 | WEIGHT: 174 LBS

## 2023-11-24 PROCEDURE — 99214 OFFICE O/P EST MOD 30 MIN: CPT

## 2023-12-19 ENCOUNTER — APPOINTMENT (OUTPATIENT)
Dept: PAIN MANAGEMENT | Facility: CLINIC | Age: 58
End: 2023-12-19
Payer: MEDICARE

## 2023-12-19 ENCOUNTER — LABORATORY RESULT (OUTPATIENT)
Age: 58
End: 2023-12-19

## 2023-12-19 ENCOUNTER — RESULT CHARGE (OUTPATIENT)
Age: 58
End: 2023-12-19

## 2023-12-19 VITALS
WEIGHT: 174 LBS | BODY MASS INDEX: 27.31 KG/M2 | HEART RATE: 84 BPM | DIASTOLIC BLOOD PRESSURE: 81 MMHG | HEIGHT: 67 IN | SYSTOLIC BLOOD PRESSURE: 141 MMHG

## 2023-12-19 LAB
AMP / AMPHETAMINE: NEGATIVE
BAR / SECOBARBITAL: NEGATIVE
BUP / BUPRENORPHINE: NEGATIVE
BZO / OXAZEPAM: NEGATIVE
COC / COCAINE: NEGATIVE
CREATININE: NORMAL MG/DL
MDMA / METHYLENEDIOXYMETHAMPHETAMINE: NEGATIVE
MET / METHAMPHETAMINES: NEGATIVE
MOP / MORPHINE: POSITIVE
MTD / METHADONE: NEGATIVE
OXY / OXYCODONE: POSITIVE
PCP / PHENCYCLIDINE: NEGATIVE
PH: NORMAL
SPECIFIC GRAVITY: NORMAL
TEMPERATURE: 90 F
THC / MARIJUANA: NEGATIVE

## 2023-12-19 PROCEDURE — 99214 OFFICE O/P EST MOD 30 MIN: CPT

## 2023-12-19 PROCEDURE — 80305 DRUG TEST PRSMV DIR OPT OBS: CPT | Mod: QW

## 2023-12-19 NOTE — HISTORY OF PRESENT ILLNESS
[FreeTextEntry1] : ORIGINAL PRESENTATION: She is a 58-year-old female who continues to follow up with our office regarding cervical radicular complaints status post laminectomy as well as lumbar pain complaints with radicular symptoms status post laminectomy as well. The patient has failed numerous trials of injection therapies in the past and does continue with medication management.  TODAY: She presents for a revisit appointment. She suffers with chronic cervical and lumbar pain with radiculopathy which she is receiving continuing active treatment for. She continues to have increased pain in her lower back radiating down her L leg with associated numbness. On her last visit her Oxycontin was  to 30 mg TID. She continues with Oxycodone 30 mg TID for breakthrough. We will decrease her Oxycodone to 20 mg TID today. Patient has not wanted to undergo any PT or injections.  Of note, patient had a + fecal occult blood test. She saw her PCP. She was supposed to see a GI specialist which was postponed to March.  UDS: repeated today, 23 - see separate note. XU, 23 - LOW RISK.

## 2023-12-19 NOTE — DISCUSSION/SUMMARY
[de-identified] : Ms. Samantha Gagnon is a 58 year old female with chronic lumbar and cervical pain along with radiculopathy. She is a candidate for a caudal injection, which she defers at this time. She is not interested in PT. Low impact exercises were discussed. Her Oxycontin was decreased to 30 mg TID. Today, we will decrease her oxycodone IR to 20 mg TID. She will follow up in 4 weeks for reevaluation.  I have consulted the  registry for the purpose of reviewing the patient's controlled substance. Urine drug screening collected today. Sample to be sent for confirmatory testing.  GERMÁN Arrieta MD

## 2023-12-28 LAB
PM 6 MAM: NEGATIVE NG/ML
PM 7-AMINO-CLONAZ: NEGATIVE NG/ML
PM ALPHA-HYDROXY-ALPRAZOLAM: NEGATIVE NG/ML
PM ALPHA-HYDROXY-MIDAZOLAM: NEGATIVE NG/ML
PM ALPRAZOLAM: NEGATIVE NG/ML
PM AMOBARBITAL: NEGATIVE NG/ML
PM AMPHETAMINE INTERP: NEGATIVE
PM AMPHETAMINE: NEGATIVE NG/ML
PM BARBURATES INTERP: NEGATIVE
PM BEG: NEGATIVE NG/ML
PM BENZODIAZEPINES INTERP: POSITIVE
PM BUPRENORPHINE INTERP: NEGATIVE
PM BUPRENORPHINE: NEGATIVE NG/ML
PM BUTALBITAL: NEGATIVE NG/ML
PM CLONAZEPAM: NEGATIVE NG/ML
PM COCAINE INTERP: NEGATIVE
PM COCAINE: NEGATIVE NG/ML
PM CODIENE: NEGATIVE NG/ML
PM COTININE: >1000 NG/ML
PM DIAZEPAM: NEGATIVE NG/ML
PM DIHYROCODEINE: NEGATIVE NG/ML
PM EDDP: NEGATIVE NG/ML
PM FENTANYL INTERP: NEGATIVE
PM FENTANYL: NEGATIVE NG/ML
PM FLUNITRAZEPAM: NEGATIVE NG/ML
PM FLURAZEPAM: NEGATIVE NG/ML
PM HYDROCODONE: NEGATIVE NG/ML
PM HYDROMORPHONE: NEGATIVE NG/ML
PM LORAZEPAM: 46 NG/ML
PM MARIJUANA (DELTA-9-THC): NEGATIVE NG/ML
PM MARIJUANA INTERP: NEGATIVE
PM MDA: NEGATIVE NG/ML
PM MDEA: NEGATIVE NG/ML
PM MDMA: NEGATIVE NG/ML
PM MEPERIDINE: NEGATIVE NG/ML
PM METHADONE INTERP: NEGATIVE
PM METHADONE: NEGATIVE NG/ML
PM METHAMPHETAMINE: NEGATIVE NG/ML
PM MIDAZOLAM: NEGATIVE NG/ML
PM MORPHINE: NEGATIVE NG/ML
PM NALOXONE: NEGATIVE NG/ML
PM NALTREXONE: NEGATIVE NG/ML
PM NICOTINE INTERP: POSITIVE
PM NORBUPRENORPHINE: NEGATIVE NG/ML
PM NORDIAZEPAM: NEGATIVE NG/ML
PM NORFENTANYL: NEGATIVE NG/ML
PM NORMEPERIDINE: NEGATIVE NG/ML
PM NOROXYCODONE: >1000 NG/ML
PM OPIOID INTERP: NEGATIVE
PM OXAZEPAM: NEGATIVE NG/ML
PM OXXYCODONE INTERP: POSITIVE
PM OXYCODONE: >1000 NG/ML
PM OXYMORPHONE: >1000 NG/ML
PM PCP: NEGATIVE NG/ML
PM PHENCYCLIDINE INTERP: NEGATIVE
PM PHENOBARBITAL: NEGATIVE NG/ML
PM PPX: NEGATIVE NG/ML
PM PROPOXYPHENE INTERP: NEGATIVE
PM SECOBARBITAL: NEGATIVE NG/ML
PM SUFENTANIL: NEGATIVE NG/ML
PM TAPENTADOL: NEGATIVE NG/ML
PM TEMAZEPAM: NEGATIVE NG/ML
PM TRAMADOL INTERP: NEGATIVE
PM TRAMADOL: NEGATIVE NG/ML

## 2024-01-19 ENCOUNTER — APPOINTMENT (OUTPATIENT)
Dept: PAIN MANAGEMENT | Facility: CLINIC | Age: 59
End: 2024-01-19
Payer: MEDICARE

## 2024-01-19 PROCEDURE — 99214 OFFICE O/P EST MOD 30 MIN: CPT

## 2024-01-19 NOTE — DISCUSSION/SUMMARY
[de-identified] : Ms. Samantha Gagnon is a 58 year old female with chronic lumbar and cervical pain along with radiculopathy. She is a candidate for a caudal injection, which she defers at this time. She is not interested in PT. Low impact exercises were discussed. Today, her Oxycontin will be decreased to 20 mg TID. She will continue with oxycodone IR 20 mg TID. She will follow up in 4 weeks for reevaluation.  I have consulted the  registry for the purpose of reviewing the patient's controlled substance. There are no inconsistencies on urine toxicology screening which would necessitate an alteration of therapy.  GERMÁN Arrieta MD

## 2024-01-19 NOTE — HISTORY OF PRESENT ILLNESS
[FreeTextEntry1] : ORIGINAL PRESENTATION: She is a 58-year-old female who continues to follow up with our office regarding cervical radicular complaints status post laminectomy as well as lumbar pain complaints with radicular symptoms status post laminectomy as well. The patient has failed numerous trials of injection therapies in the past and does continue with medication management.  TODAY: She presents for a revisit appointment. She suffers with chronic cervical and lumbar pain with radiculopathy which she is receiving continuing active treatment for. She continues to have increased pain in her lower back radiating down her L leg with associated numbness. We are weaning her off of her narcotic regimen. On her last visit, her Oxycodone was  to 20 mg TID. Today, we will decrease her Oxycontin to 20 mg TID. Patient has not wanted to undergo any PT or injections.  Of note, patient had a + fecal occult blood test. She saw her PCP. She was supposed to see a GI specialist which was postponed to March.  UDS: 23 - Consistent. XU, 23 - LOW RISK.

## 2024-02-15 ENCOUNTER — APPOINTMENT (OUTPATIENT)
Dept: PAIN MANAGEMENT | Facility: CLINIC | Age: 59
End: 2024-02-15

## 2024-02-21 ENCOUNTER — APPOINTMENT (OUTPATIENT)
Dept: PAIN MANAGEMENT | Facility: CLINIC | Age: 59
End: 2024-02-21
Payer: MEDICARE

## 2024-02-21 PROCEDURE — 99214 OFFICE O/P EST MOD 30 MIN: CPT

## 2024-02-28 ENCOUNTER — APPOINTMENT (OUTPATIENT)
Dept: PAIN MANAGEMENT | Facility: CLINIC | Age: 59
End: 2024-02-28

## 2024-03-21 ENCOUNTER — APPOINTMENT (OUTPATIENT)
Dept: PAIN MANAGEMENT | Facility: CLINIC | Age: 59
End: 2024-03-21
Payer: MEDICARE

## 2024-03-21 PROCEDURE — 99214 OFFICE O/P EST MOD 30 MIN: CPT

## 2024-03-21 NOTE — ASSESSMENT
[FreeTextEntry1] : Ms. Samantha Gagnon is a 58 year old female with chronic lumbar and cervical pain along with radiculopathy. She is not interested in PT.  She is medically managed in our office and we are currently in the process of weaning her off. Today we will decrease her Oxycodone IR 20 mg BID to 10 mg TID  and continue on OxyContin 20 mg TID. The plan is to completely wean her off her medications.  We discussed injection therapy and pt is agreeable, we will schedule her for a caudal MARTHA with MAC.  She will follow up in 4 weeks for reassessment and medication refill. All this patient's questions were answered and the conversation was understood well.  Patient had a MRI that shows a radicular component along with pain referred into the lower extremity. Patient has trialed rehab (Home  exercise, physical therapy or chiropractic care) and medications. I will schedule a caudal 1-3 depending on effectiveness.  The risks and benefits were discussed, which included the associated problems with steroids, bleeding, nerve injury, lack of improvement with pain, and 0.5% chance of a post dural puncture headache.   The patient has severe anxiety of procedures that necessitates monitored anesthesia care (MAC). The procedure performed will be close to major nerves, arteries, and spinal cord and/or joint structures. Due to the proximity of these structures, we need the patient to be still during the procedure.  With the help of MAC, this will be safely achieved and decrease the risk of any complications.  I have consulted the  registry for the purpose of reviewing the patient's controlled substance.   I explained the risk of addiction, tolerance and withdrawals and  advised the patient they must keep their medication under a lock and key, or in a safe place away from children or other individuals. This medication given is solely for the patient and under no circumstances to be shared. Patient verbalized this and is in agreement with the aforementioned. I explained the risk of addiction, tolerance, and withdrawals. UDS will be done randomly and a drug agreement was signed.  The patient will return to the office in 4 weeks time and is aware to contact me with any question or concerns in the interim.  GERMÁN Arrieta MD

## 2024-03-21 NOTE — PHYSICAL EXAM
[Normal Coordination] : normal coordination [Normal DTR UE/LE] : normal DTR UE/LE  [Normal Mood and Affect] : normal mood and affect [Normal Sensation] : normal sensation [Normal Skin] : normal skin [Oriented] : oriented [No Rash] : no rash [No Ulcers] : no ulcers [No Lesions] : no lesions [No obvious lymphadenopathy in areas examined] : no obvious lymphadenopathy in areas examined [] : diminished ROM in all planes

## 2024-03-21 NOTE — HISTORY OF PRESENT ILLNESS
[FreeTextEntry1] : ORIGINAL PRESENTATION: She is a 58-year-old female who continues to follow up with our office regarding cervical radicular complaints status post laminectomy as well as lumbar pain complaints with radicular symptoms status post laminectomy as well. The patient has failed numerous trials of injection therapies in the past and does continue with medication management.  PATIENT PRESENTS FOR FOLLOW UP:  This is a 58 year old pt, who is a former Dr. Corado patient who transferred her care to Dr. Haynes.  She is under our care for chronic cervical and lumbar pain with radiculopathy which she is receiving continuing active treatment for. She continues to have increased pain in her lower back radiating down her L leg with associated numbness.  She has been medically managed in our office on Oxycodone IR 20 TID and OxyContin 20 mg TID. We are in the process of weaning her off her medications, the goal is to completely wean her off. Weaning off schedule  (12/19/23): Oxycodone 30 TID Oxycodone 20 mg TID + Continue on OxyContin 30 TID (1/19/24): OxyContin 30 TID  Oxycontin to 20 mg TID + Continue on Oxycodone 20 mg TID (2/21/24): Oxycodone IR 20 mg from TID to BID dosing + Continue on OxyContin 20 mg TID (3/21/24): Oxycodone IR 20 mg BID to 10 mg TID  + Continue on OxyContin 20 mg TID  With regards to her lumbar spine, we discussed injection therapy and pt is agreeable. We will schedule her for a caudal MARTHA with MAC.

## 2024-04-02 ENCOUNTER — APPOINTMENT (OUTPATIENT)
Dept: PAIN MANAGEMENT | Facility: CLINIC | Age: 59
End: 2024-04-02

## 2024-04-09 ENCOUNTER — APPOINTMENT (OUTPATIENT)
Dept: PAIN MANAGEMENT | Facility: CLINIC | Age: 59
End: 2024-04-09

## 2024-04-18 ENCOUNTER — APPOINTMENT (OUTPATIENT)
Dept: PAIN MANAGEMENT | Facility: CLINIC | Age: 59
End: 2024-04-18
Payer: MEDICARE

## 2024-04-18 PROCEDURE — 99214 OFFICE O/P EST MOD 30 MIN: CPT

## 2024-04-18 NOTE — PHYSICAL EXAM
[Normal Coordination] : normal coordination [Normal DTR UE/LE] : normal DTR UE/LE  [Normal Sensation] : normal sensation [Normal Mood and Affect] : normal mood and affect [Oriented] : oriented [Normal Skin] : normal skin [No Rash] : no rash [No Ulcers] : no ulcers [No Lesions] : no lesions [No obvious lymphadenopathy in areas examined] : no obvious lymphadenopathy in areas examined [] : diminished ROM in all planes

## 2024-04-18 NOTE — ASSESSMENT
[FreeTextEntry1] : Ms. Samantha Gagnon is a 58 year old female with chronic lumbar and cervical pain along with radiculopathy. She is not interested in PT. She is medically managed in our office and we are currently in the process of weaning her off. Today we will decrease her Oxycontin to 15 mg TID. She will continue on Oxycodone 10 mg TID. The plan is to completely wean her off her medications.  She is scheduled for her caudal MARTHA with MAC on 4/30/24. She will follow up in 4 weeks for reassessment and medication refill. Patient will follow up with a repeat UDS prior to her next office visit. All this patient's questions were answered and the conversation was understood well.  I have consulted the  registry for the purpose of reviewing the patient's controlled substance.   I explained the risk of addiction, tolerance and withdrawals and  advised the patient they must keep their medication under a lock and key, or in a safe place away from children or other individuals. This medication given is solely for the patient and under no circumstances to be shared. Patient verbalized this and is in agreement with the aforementioned. I explained the risk of addiction, tolerance, and withdrawals. UDS will be done randomly and a drug agreement was signed.  The patient will return to the office in 4 weeks' time and is aware to contact me with any question or concerns in the interim.  GERMÁN Arrieta MD

## 2024-04-18 NOTE — HISTORY OF PRESENT ILLNESS
[FreeTextEntry1] : ORIGINAL PRESENTATION: She is a 58-year-old female who continues to follow up with our office regarding cervical radicular complaints status post laminectomy as well as lumbar pain complaints with radicular symptoms status post laminectomy as well. The patient has failed numerous trials of injection therapies in the past and does continue with medication management.  PATIENT PRESENTS FOR FOLLOW UP:  This is a 58 year old pt, who is a former Dr. Corado patient who transferred her care to Dr. Haynes.  She is under our care for chronic cervical and lumbar pain with radiculopathy which she is receiving continuing active treatment for. She continues to have increased pain in her lower back radiating down her L leg with associated numbness. She was supposed to undergo a caudal injection, but the injection had to be cancelled due to the patient having bronchitis. The injection was re-scheduled to 4/30/24.  She has been medically managed in our office on Oxycodone IR 10 mg TID and OxyContin 20 mg TID. We are in the process of weaning her off her medications, the goal is to completely wean her off. Weaning off schedule  (12/19/23): Oxycodone 30 TID Oxycodone 20 mg TID + Continue on OxyContin 30 TID (1/19/24): OxyContin 30 TID  Oxycontin to 20 mg TID + Continue on Oxycodone 20 mg TID (2/21/24): Oxycodone IR 20 mg from TID to BID dosing + Continue on OxyContin 20 mg TID (3/21/24): Oxycodone IR 20 mg BID to 10 mg TID  + Continue on OxyContin 20 mg TID (4/18/24): Continue Oxycodone 10 mg TID + Oxycontin 20 mg TID to Oxycontin 15 mg TID

## 2024-04-30 ENCOUNTER — APPOINTMENT (OUTPATIENT)
Dept: PAIN MANAGEMENT | Facility: CLINIC | Age: 59
End: 2024-04-30

## 2024-05-16 ENCOUNTER — APPOINTMENT (OUTPATIENT)
Dept: PAIN MANAGEMENT | Facility: CLINIC | Age: 59
End: 2024-05-16

## 2024-05-17 ENCOUNTER — APPOINTMENT (OUTPATIENT)
Dept: PAIN MANAGEMENT | Facility: CLINIC | Age: 59
End: 2024-05-17
Payer: MEDICARE

## 2024-05-17 ENCOUNTER — LABORATORY RESULT (OUTPATIENT)
Age: 59
End: 2024-05-17

## 2024-05-17 PROCEDURE — 99214 OFFICE O/P EST MOD 30 MIN: CPT

## 2024-05-17 NOTE — ASSESSMENT
[FreeTextEntry1] : Ms. Samantha Gagnon is a 58 year old female with chronic lumbar and cervical pain along with radiculopathy. She is not interested in PT. She was scheduled for her caudal MARTHA with MAC on 4/30/24, she cancelled the injection due to her ongoing sickness and will call our office to reschedule when she is feeling better. She is medically managed in our office and we are currently in the process of weaning her off. Today, we will decrease her Oxycodone 10 mg TID to 5 mg TID and she will remain on Oxycontin 15 mg TID. The plan is to completely wean her off her medications.  She will follow up in 4 weeks for reassessment and medication refill. Patient will follow up with a repeat UDS prior to her next office visit. All this patient's questions were answered and the conversation was understood well.  I have consulted the  registry for the purpose of reviewing the patient's controlled substance.   I explained the risk of addiction, tolerance and withdrawals and  advised the patient they must keep their medication under a lock and key, or in a safe place away from children or other individuals. This medication given is solely for the patient and under no circumstances to be shared. Patient verbalized this and is in agreement with the aforementioned. I explained the risk of addiction, tolerance, and withdrawals. UDS will be done randomly and a drug agreement was signed.  The patient will return to the office in 4 weeks' time and is aware to contact me with any question or concerns in the interim.  GERMÁN Arrieta MD

## 2024-05-17 NOTE — HISTORY OF PRESENT ILLNESS
[FreeTextEntry1] : ORIGINAL PRESENTATION: She is a 58-year-old female who continues to follow up with our office regarding cervical radicular complaints status post laminectomy as well as lumbar pain complaints with radicular symptoms status post laminectomy as well. The patient has failed numerous trials of injection therapies in the past and does continue with medication management.  PATIENT PRESENTS FOR FOLLOW UP:  This is a 58 year old patient who presents for a revisit. She is under our care for chronic cervical and lumbar pain with radiculopathy which she is receiving continuing active treatment for. She continues to have increased pain in her lower back radiating down her L leg with associated numbness. She was supposed to undergo a caudal injection, but the injection had to be cancelled due to the patient having bronchitis. On her last visit she was re-scheduled to 4/30/24, however she cancelled again because she is still dealing with "allergies and COPD exacerbation attacks." She plans to see her doctor next week for evaluation. She will call our office to schedule the injection once she takes care of these issues.   She has been medically managed in our office on Oxycodone and OxyContin. We are in the process of weaning her off her medications, the goal is to completely wean her off. Weaning off schedule  (12/19/23): Oxycodone 30 TID Oxycodone 20 mg TID + Continue on OxyContin 30 TID (1/19/24): OxyContin 30 TID  Oxycontin to 20 mg TID + Continue on Oxycodone 20 mg TID (2/21/24): Oxycodone IR 20 mg from TID to BID dosing + Continue on OxyContin 20 mg TID (3/21/24): Oxycodone IR 20 mg BID to 10 mg TID  + Continue on OxyContin 20 mg TID (4/18/24): Continue Oxycodone 10 mg TID + Oxycontin 20 mg TID to Oxycontin 15 mg TID (5/17/24): Continue Oxycontin 15 mg TID + decrease Oxycodone 10 mg TID to  5 mg TID  Patient voiced frustration about being weaned off of her medications. I advised her that our office is no longer prescribing narcotics and for her current condition, she would benefit from PT and injection therapy. I have provided her with the option of seeing another pain management doctor if she feels that she needs her narcotics to carry out her ADLs. She wishes to continue her care at our office at this time.

## 2024-05-30 LAB
PM 6 MAM: NEGATIVE NG/ML
PM 7-AMINO-CLONAZ: NEGATIVE NG/ML
PM ALPHA-HYDROXY-ALPRAZOLAM: NEGATIVE NG/ML
PM ALPHA-HYDROXY-MIDAZOLAM: NEGATIVE NG/ML
PM ALPRAZOLAM: NEGATIVE NG/ML
PM AMOBARBITAL: NEGATIVE NG/ML
PM AMPHETAMINE INTERP: NEGATIVE
PM AMPHETAMINE: NEGATIVE NG/ML
PM BARBURATES INTERP: NEGATIVE
PM BEG: NEGATIVE NG/ML
PM BENZODIAZEPINES INTERP: NEGATIVE
PM BUPRENORPHINE INTERP: NEGATIVE
PM BUPRENORPHINE: NEGATIVE NG/ML
PM BUTALBITAL: NEGATIVE NG/ML
PM CLONAZEPAM: NEGATIVE NG/ML
PM COCAINE INTERP: NEGATIVE
PM COCAINE: NEGATIVE NG/ML
PM CODIENE: NEGATIVE NG/ML
PM COTININE: >1000 NG/ML
PM DIAZEPAM: NEGATIVE NG/ML
PM DIHYROCODEINE: NEGATIVE NG/ML
PM EDDP: NEGATIVE NG/ML
PM FENTANYL INTERP: NEGATIVE
PM FENTANYL: NEGATIVE NG/ML
PM FLUNITRAZEPAM: NEGATIVE NG/ML
PM FLURAZEPAM: NEGATIVE NG/ML
PM HYDROCODONE: NEGATIVE NG/ML
PM HYDROMORPHONE: NEGATIVE NG/ML
PM LORAZEPAM: NEGATIVE NG/ML
PM MARIJUANA (DELTA-9-THC): NEGATIVE NG/ML
PM MARIJUANA INTERP: NEGATIVE
PM MDA: NEGATIVE NG/ML
PM MDEA: NEGATIVE NG/ML
PM MDMA: NEGATIVE NG/ML
PM MEPERIDINE: NEGATIVE NG/ML
PM METHADONE INTERP: NEGATIVE
PM METHADONE: NEGATIVE NG/ML
PM METHAMPHETAMINE: NEGATIVE NG/ML
PM MIDAZOLAM: NEGATIVE NG/ML
PM MORPHINE: NEGATIVE NG/ML
PM NALOXONE: NEGATIVE NG/ML
PM NALTREXONE: NEGATIVE NG/ML
PM NICOTINE INTERP: POSITIVE
PM NORBUPRENORPHINE: NEGATIVE NG/ML
PM NORDIAZEPAM: NEGATIVE NG/ML
PM NORFENTANYL: NEGATIVE NG/ML
PM NORMEPERIDINE: NEGATIVE NG/ML
PM NOROXYCODONE: >1000 NG/ML
PM OPIOID INTERP: NEGATIVE
PM OXAZEPAM: NEGATIVE NG/ML
PM OXXYCODONE INTERP: POSITIVE
PM OXYCODONE: >1000 NG/ML
PM OXYMORPHONE: 202 NG/ML
PM PCP: NEGATIVE NG/ML
PM PHENCYCLIDINE INTERP: NEGATIVE
PM PHENOBARBITAL: NEGATIVE NG/ML
PM PPX: NEGATIVE NG/ML
PM PROPOXYPHENE INTERP: NEGATIVE
PM SECOBARBITAL: NEGATIVE NG/ML
PM SUFENTANIL: NEGATIVE NG/ML
PM TAPENTADOL: NEGATIVE NG/ML
PM TEMAZEPAM: NEGATIVE NG/ML
PM TRAMADOL INTERP: NEGATIVE
PM TRAMADOL: NEGATIVE NG/ML

## 2024-06-14 ENCOUNTER — APPOINTMENT (OUTPATIENT)
Dept: PAIN MANAGEMENT | Facility: CLINIC | Age: 59
End: 2024-06-14
Payer: MEDICARE

## 2024-06-14 DIAGNOSIS — G89.29 RADICULOPATHY, LUMBAR REGION: ICD-10-CM

## 2024-06-14 DIAGNOSIS — M47.812 SPONDYLOSIS W/OUT MYELOPATHY OR RADICULOPATHY, CERVICAL REGION: ICD-10-CM

## 2024-06-14 DIAGNOSIS — Z79.891 LONG TERM (CURRENT) USE OF OPIATE ANALGESIC: ICD-10-CM

## 2024-06-14 DIAGNOSIS — M54.16 RADICULOPATHY, LUMBAR REGION: ICD-10-CM

## 2024-06-14 DIAGNOSIS — M96.1 POSTLAMINECTOMY SYNDROME, NOT ELSEWHERE CLASSIFIED: ICD-10-CM

## 2024-06-14 DIAGNOSIS — M47.816 SPONDYLOSIS W/OUT MYELOPATHY OR RADICULOPATHY, LUMBAR REGION: ICD-10-CM

## 2024-06-14 DIAGNOSIS — G89.29 OTHER CHRONIC PAIN: ICD-10-CM

## 2024-06-14 PROCEDURE — 99214 OFFICE O/P EST MOD 30 MIN: CPT

## 2024-06-14 RX ORDER — OXYCODONE 5 MG/1
5 TABLET ORAL TWICE DAILY
Qty: 60 | Refills: 0 | Status: ACTIVE | COMMUNITY
Start: 2023-09-21 | End: 1900-01-01

## 2024-06-14 RX ORDER — OXYCODONE HYDROCHLORIDE 15 MG/1
15 TABLET, FILM COATED, EXTENDED RELEASE ORAL 3 TIMES DAILY
Qty: 90 | Refills: 0 | Status: ACTIVE | COMMUNITY
Start: 2022-07-06 | End: 1900-01-01

## 2024-06-14 NOTE — HISTORY OF PRESENT ILLNESS
[FreeTextEntry1] : ORIGINAL PRESENTATION: She is a 58-year-old female who continues to follow up with our office regarding cervical radicular complaints status post laminectomy as well as lumbar pain complaints with radicular symptoms status post laminectomy as well. The patient has failed numerous trials of injection therapies in the past and does continue with medication management.  PATIENT PRESENTS FOR FOLLOW UP:  This is a 58 year old patient who presents for a revisit. She is under our care for chronic cervical and lumbar pain with radiculopathy which she is receiving continuing active treatment for. She continues to have increased pain in her lower back radiating down her L leg with associated numbness. She was supposed to undergo a caudal injection, but the injection had to be cancelled due to the patient having bronchitis. On her last visit she was re-scheduled to 4/30/24, however she cancelled again because she was still dealing with "allergies and COPD exacerbation attacks." We spoke about proceeding with the injection today which she wishes to do.    She has been medically managed in our office on Oxycodone and OxyContin. We are in the process of weaning her off her medications, the goal is to completely wean her off. Weaning off schedule (12/19/23): Oxycodone 30 TID Oxycodone 20 mg TID + Continue on OxyContin 30 TID (1/19/24): OxyContin 30 TID Oxycontin to 20 mg TID + Continue on Oxycodone 20 mg TID (2/21/24): Oxycodone IR 20 mg from TID to BID dosing + Continue on OxyContin 20 mg TID (3/21/24): Oxycodone IR 20 mg BID to 10 mg TID + Continue on OxyContin 20 mg TID (4/18/24): Continue Oxycodone 10 mg TID + Oxycontin 20 mg TID to Oxycontin 15 mg TID (5/17/24): Continue Oxycontin 15 mg TID + decrease Oxycodone 10 mg TID to 5 mg TID (6/14/24): Continue Oxycontin 15 mg TID + decrease Oxycodone 5 mg TID to BID

## 2024-06-14 NOTE — ASSESSMENT
[FreeTextEntry1] : Ms. Samantha Gagnon is a 58 year old female with chronic lumbar and cervical pain along with radiculopathy. She is not interested in PT. She will proceed with a caudal injection without MAC. She is medically managed in our office and we are currently in the process of weaning her off. Today, we will decrease her Oxycodone 5 mg TID to 5 mg BID and she will remain on Oxycontin 15 mg TID. The plan is to completely wean her off her medications.  She will follow up in 4 weeks for reassessment and medication refill. All this patient's questions were answered and the conversation was understood well.  Patient had a MRI that shows a radicular component along with pain referred into the lower extremity. Patient has trialed rehab (Home exercise, physical therapy or chiropractic care) and medications I will schedule a Caudal 1-3 depending on effectiveness.  Risk, benefits, pros and cons of the procedure were explained to the patient using models and diagrams and their questions were answered.  I have consulted the  registry for the purpose of reviewing the patient's controlled substance.   I explained the risk of addiction, tolerance and withdrawals and  advised the patient they must keep their medication under a lock and key, or in a safe place away from children or other individuals. This medication given is solely for the patient and under no circumstances to be shared. Patient verbalized this and is in agreement with the aforementioned. I explained the risk of addiction, tolerance, and withdrawals. UDS will be done randomly and a drug agreement was signed.  The patient will return to the office in 4 weeks' time and is aware to contact me with any question or concerns in the interim.  GERMÁN Arrieta MD

## 2024-07-09 ENCOUNTER — APPOINTMENT (OUTPATIENT)
Dept: PAIN MANAGEMENT | Facility: CLINIC | Age: 59
End: 2024-07-09

## 2024-07-12 ENCOUNTER — APPOINTMENT (OUTPATIENT)
Dept: PAIN MANAGEMENT | Facility: CLINIC | Age: 59
End: 2024-07-12

## 2024-07-16 ENCOUNTER — APPOINTMENT (OUTPATIENT)
Dept: PAIN MANAGEMENT | Facility: CLINIC | Age: 59
End: 2024-07-16
Payer: MEDICARE

## 2024-07-16 DIAGNOSIS — M96.1 POSTLAMINECTOMY SYNDROME, NOT ELSEWHERE CLASSIFIED: ICD-10-CM

## 2024-07-16 DIAGNOSIS — G89.29 RADICULOPATHY, LUMBAR REGION: ICD-10-CM

## 2024-07-16 DIAGNOSIS — G89.29 OTHER CHRONIC PAIN: ICD-10-CM

## 2024-07-16 DIAGNOSIS — M54.16 RADICULOPATHY, LUMBAR REGION: ICD-10-CM

## 2024-07-16 PROCEDURE — 99214 OFFICE O/P EST MOD 30 MIN: CPT

## 2024-08-12 ENCOUNTER — RX RENEWAL (OUTPATIENT)
Age: 59
End: 2024-08-12

## 2024-08-20 ENCOUNTER — APPOINTMENT (OUTPATIENT)
Dept: PAIN MANAGEMENT | Facility: CLINIC | Age: 59
End: 2024-08-20